# Patient Record
(demographics unavailable — no encounter records)

---

## 2025-04-23 NOTE — HISTORY OF PRESENT ILLNESS
[FreeTextEntry1] : Ms. LAUREL MANCERA is a 75 year old female presents for screening colonoscopy. Patient has no complaints of bowel issues, bleeding, abdominal pain, GERD symptoms.  Patient has a family history of colon cancer, and a personal history of colon polyps.  Last colonoscopy was in 2022.

## 2025-04-23 NOTE — ASSESSMENT
[FreeTextEntry1] : 76 yo female with family history of colon cancer and personal history of colon polyps. Arrange colonoscopy with Suprep.

## 2025-06-18 NOTE — HISTORY OF PRESENT ILLNESS
[] : no [FreeTextEntry5] : 76 y/o F presents for NP eval today. Pt reports of pain for the past month. Notes onset after playing tennis. Moderate ROM. Advil as needed.

## 2025-06-18 NOTE — HISTORY OF PRESENT ILLNESS
[] : no [FreeTextEntry5] : 74 y/o F presents for NP eval today. Pt reports of pain for the past month. Notes onset after playing tennis. Moderate ROM. Advil as needed.

## 2025-06-18 NOTE — ASSESSMENT
[FreeTextEntry1] : The patient is a 74 yo woman presenting with right shoulder pain. She denies new trauma. She had pain after playing tennis and had pain after doing warm up shoulder circles. The patient reports anterior shoulder pain with overuse. She has pain with reaching behind her back and overhead. She has pain lifting things to shoulder height. The patient has tried advil and tylenol with some relief. She has had CSI in the past with good relief.   Right shoulder exam: The patient presents with no apparent distress. Neurovascularly intact. Sensation intact to right upper extremity. No scars, rashes, or abrasions. 2+ pulses to the distal radius.Tender to palpation at the anterolateral shoulder and supraspinatus. AROM  ABD 90 ER 65 IR L5. 5/5 RC strength. 5/5 Deltoid strength. +Neer. +Ashley.   Right shoulder xrays taken today in office, 5 views WB: No GHOA. No AC OA. GH joint is reduced. No fractures. Type II acromion. No obvious tumors, masses, or lesions  The patient received a right shoulder subacromial CSI. She tolerated it well. She will consider an MRI if not better.

## 2025-06-18 NOTE — ASSESSMENT
Jean Raza 71 y o  female MRN: [id number]    Encounter: [id number]      Assessment/Plan     1  Type 2 diabetes mellitus on long term insulin therapy with hyperglycemia  2  Obesity  3  History of acute pancreatitis  Poorly controlled Last A1c 9 6% however this is an improvement from 13 2% previously     Recommend the following at this time  Increase toujeo to 58 units at bedtime   continue novolog 20 units threee times a day with meals, advised to take it before the meal rather than after  - sliding scale insulin   -consult with CDE  - eye exam   -follow-up in 3 months with repeat labs  - consistent carb diet, regular exercise     4  Hyperlipidemia, hypertriglyceridemia  - continue statin therapy, fenofibrate   Start Vascepa 1 g twice a day     5  Hypertension  Blood pressure at goal    6  Hypothyroidism-continue current dose of levothyroxine      CC: Diabetes    History of Present Illness     HPI:  Jean Raza is a 71 y o  female presents for a follow-up visit regarding diabetes management  Also has hyperlipidemia, hypertriglyceridemia , hypothyroidism, osteoperosis, vitamin D     Last seen in 2022  Last Eye exam: due for exam     Current regimen: Toujeo 50 units at bedtime   novolog 20-25 units after meals 30min-1 hr after   Glipizide XL 10  Mg BID    admits to not eating well   Struggling with portion control, tends to eat late at night because she is hungry   B: egg mcmuffin   L: tries to eat salad   Trying to eat more fresh fruits and veggies   Has not met with CDE   gained weight   Not much exercise     Statin:  lipitor 80 + gemfibrozil 600 mg BID  ACE-I/ARB: --    Levothyroxine 50 mcg orally daily, compliant, dex item number is normal    Home glucose monitorin- 421 mg/dl   Symptoms of hypoglycemia :  No lows     All other systems were reviewed and are negative      Review of Systems      Historical Information   Past Medical History:   Diagnosis Date    Acute cystitis without [FreeTextEntry1] : The patient is a 76 yo woman presenting with right shoulder pain. She denies new trauma. She had pain after playing tennis and had pain after doing warm up shoulder circles. The patient reports anterior shoulder pain with overuse. She has pain with reaching behind her back and overhead. She has pain lifting things to shoulder height. The patient has tried advil and tylenol with some relief. She has had CSI in the past with good relief.   Right shoulder exam: The patient presents with no apparent distress. Neurovascularly intact. Sensation intact to right upper extremity. No scars, rashes, or abrasions. 2+ pulses to the distal radius.Tender to palpation at the anterolateral shoulder and supraspinatus. AROM  ABD 90 ER 65 IR L5. 5/5 RC strength. 5/5 Deltoid strength. +Neer. +Ashley.   Right shoulder xrays taken today in office, 5 views WB: No GHOA. No AC OA. GH joint is reduced. No fractures. Type II acromion. No obvious tumors, masses, or lesions  The patient received a right shoulder subacromial CSI. She tolerated it well. She will consider an MRI if not better.   hematuria 8/10/2018    Aortic aneurysm, abdominal (HCC)     noted on cat scan from 2/2020    Cataract     Closed left hip fracture ([facility] [address] ) 8/10/2018    Colon polyp     Diabetes mellitus ([facility] [address] )     Hyperlipemia     Migraines     Multiple falls      Past Surgical History:   Procedure Laterality Date    BREAST CYST ASPIRATION Right 1993    CHOLECYSTECTOMY      COLONOSCOPY      ELBOW SURGERY      GALLBLADDER SURGERY      NE COLONOSCOPY FLX DX W/COLLJ SPEC WHEN PFRMD N/A 12/14/2016    Procedure: COLONOSCOPY;  Surgeon: Gerhardt Kitten, MD;  Location: [facility];   Service: Gastroenterology    NE OPEN RX FEMUR FX+INTRAMED OLMAN Left 8/10/2018    Procedure: Left Hip IM Long nail;  Surgeon: Lorraine Caruso MD;  [facility];  Service: Orthopedics    TONSILLECTOMY       Social History   Social History     Substance and Sexual Activity   Alcohol Use No     Social History     Substance and Sexual Activity   Drug Use No     Social History     Tobacco Use   Smoking Status Former Smoker    Types: Cigarettes   Smokeless Tobacco Never Used     Family History:   Family History   Problem Relation Age of Onset    Arthritis Family     Pancreatic cancer Family     Scoliosis Family     Diabetes type II Mother     Osteoporosis Mother     Thyroid disease unspecified Mother     Hyperlipidemia Sister     Hypertension Sister     Diabetes type II Maternal Aunt     Diabetes type II Maternal Uncle     Diabetes type II Maternal Grandfather     Pancreatic cancer Paternal Grandmother     No Known Problems Father     No Known Problems Daughter     No Known Problems Maternal Grandmother     No Known Problems Paternal Grandfather     No Known Problems Daughter     No Known Problems Maternal Aunt     No Known Problems Paternal Aunt     No Known Problems Paternal Aunt     No Known Problems Paternal Aunt        Meds/Allergies   Current Outpatient Medications   Medication Sig Dispense Refill    aspirin Shekhar Packer LOW STRENGTH) 81 mg EC tablet Take 81 mg by mouth daily      atorvastatin (LIPITOR) 80 mg tablet TAKE 1 TABLET BY MOUTH EVERY DAY AT NIGHT      Cholecalciferol 25 MCG (1000 UT) capsule Take 2,000 Units by mouth daily        gabapentin (NEURONTIN) 600 MG tablet Take 600 mg by mouth 3 (three) times a day   3    gemfibrozil (LOPID) 600 mg tablet TAKE 1 TABLET BY MOUTH 2 TIMES A DAY BEFORE MEALS  180 tablet 1    glipiZIDE (GLUCOTROL XL) 10 mg 24 hr tablet Take 10 mg by mouth 2 (two) times a day  0    glucose blood (Accu-Chek Mikaela Plus) test strip Use 1 each 2 (two) times a day 200 each 3    insulin aspart (NovoLOG FlexPen) 100 UNIT/ML injection pen Inject 15 Units under the skin 3 (three) times a day with meals 60 mL 3    insulin glargine (Toujeo SoloStar) 300 units/mL CONCENTRATED U-300 injection pen (1-unit dial) Inject 50 Units under the skin daily at bedtime 60 mL 3    levothyroxine 50 mcg tablet Take 50 mcg by mouth daily      atorvastatin (LIPITOR) 40 mg tablet TAKE 1 TABLET (40 MG TOTAL) BY MOUTH DAILY TAKE AT BEDTIME (Patient not taking: Reported on 6/16/2022) 30 tablet 3    cephalexin (KEFLEX) 500 mg capsule TAKE 1 CAPSULE BY MOUTH TWICE A DAY FOR 7 DAYS      DULoxetine (CYMBALTA) 20 mg capsule Take 20 mg by mouth daily   (Patient not taking: No sig reported)  3    DULoxetine (CYMBALTA) 60 mg delayed release capsule TAKE 1 CAPSULE BY MOUTH NIGHTLY  (Patient not taking: No sig reported)      ergocalciferol (VITAMIN D2) 50,000 units  (Patient not taking: Reported on 6/16/2022)      fluconazole (DIFLUCAN) 150 mg tablet 1 tab now and 1 tab in 72 hours      fluconazole (DIFLUCAN) 150 mg tablet TAKE 1 TABLET BY MOUTH NOW AND 1 TABLET IN 72 HOURS      Insulin Pen Needle (BD Pen Needle Montse U/F) 32G X 4 MM MISC Use daily withToujeo (Patient not taking: No sig reported) 50 each 3    losartan 50 mg TABS 50 mg, hydrochlorothiazide 12 5 mg TABS 12 5 mg Take by mouth daily      metroNIDAZOLE (FLAGYL) 500 mg tablet       sulfamethoxazole-trimethoprim (BACTRIM) 400-80 mg per tablet       Edwina Fischer SoloStar 300 units/mL CONCENTRATED U-300 injection pen (2-unit dial)        No current facility-administered medications for this visit  Allergies   Allergen Reactions    Zoledronic Acid GI Intolerance     Anorexia    Codeine GI Intolerance, Nausea Only and Vomiting       Objective   Vitals: Blood pressure 136/88, pulse 86, height 5' 6" (1 676 m), weight 85 kg (187 lb 4 8 oz), not currently breastfeeding  Physical Exam  Constitutional:       Appearance: She is well-developed  HENT:      Head: Normocephalic and atraumatic  Eyes:      Conjunctiva/sclera: Conjunctivae normal       Pupils: Pupils are equal, round, and reactive to light  Neck:      Thyroid: No thyromegaly  Cardiovascular:      Rate and Rhythm: Normal rate and regular rhythm  Heart sounds: Normal heart sounds  No murmur heard  Pulmonary:      Effort: Pulmonary effort is normal       Breath sounds: Normal breath sounds  No wheezing  Abdominal:      General: There is no distension  Palpations: Abdomen is soft  Tenderness: There is no abdominal tenderness  Musculoskeletal:         General: Normal range of motion  Cervical back: Normal range of motion and neck supple  Skin:     General: Skin is warm and dry  Neurological:      Mental Status: She is alert and oriented to person, place, and time  Psychiatric:         Behavior: Behavior normal          The history was obtained from the review of the chart, patient and family  Lab Results:   Lab Results   Component Value Date/Time    Hemoglobin A1C 9 6 06/09/2022 12:00 AM    Hemoglobin A1C 13 2 02/15/2022 12:00 AM         Imaging Studies: I have personally reviewed pertinent reports  Portions of the record may have been created with voice recognition software   Occasional wrong word or "sound a like" substitutions may have occurred due to the inherent limitations of voice recognition software  Read the chart carefully and recognize, using context, where substitutions have occurred

## 2025-07-15 NOTE — HISTORY OF PRESENT ILLNESS
[Meds] : meds [] : no [FreeTextEntry5] : 76 y/o F here for follow up on the right shoulder. States pain has worsened since last visit. Very limited ROM. CSI helped until six days ago when she was playing tennis.

## 2025-07-15 NOTE — ASSESSMENT
[FreeTextEntry1] : The patient is here for continued right shoulder pain. She played tennis following her CSI that she had relief from and has since returned to her pain levels. She has pain with raising at all to shoulder height. She feels weaker overall as well. She denies new trauma. She had pain after playing tennis and had pain after doing warm up shoulder circles. The patient reports anterior shoulder pain with overuse. She has pain with reaching behind her back and overhead. She has pain lifting things to shoulder height. The patient has tried advil and tylenol with some relief.   Right shoulder exam: The patient presents with no apparent distress. Neurovascularly intact. Sensation intact to right upper extremity. No scars, rashes, or abrasions. 2+ pulses to the distal radius.Tender to palpation at the anterolateral shoulder and supraspinatus. AROM  ABD 90 ER 65 IR L5. 5/5 RC strength. 5/5 Deltoid strength. +Neer. +Ashley.   The patient will go for MRI of the right shoulder to r/o RCT. She was prescribed prednisone for pain. She will return once MRI is completed.